# Patient Record
Sex: FEMALE | Race: OTHER | HISPANIC OR LATINO | URBAN - METROPOLITAN AREA
[De-identification: names, ages, dates, MRNs, and addresses within clinical notes are randomized per-mention and may not be internally consistent; named-entity substitution may affect disease eponyms.]

---

## 2019-01-11 ENCOUNTER — EMERGENCY (EMERGENCY)
Facility: HOSPITAL | Age: 44
LOS: 1 days | Discharge: HOME | End: 2019-01-11
Attending: EMERGENCY MEDICINE
Payer: MEDICAID

## 2019-01-11 VITALS
RESPIRATION RATE: 16 BRPM | DIASTOLIC BLOOD PRESSURE: 77 MMHG | HEART RATE: 55 BPM | SYSTOLIC BLOOD PRESSURE: 111 MMHG | OXYGEN SATURATION: 100 % | TEMPERATURE: 98 F

## 2019-01-11 VITALS
TEMPERATURE: 98 F | RESPIRATION RATE: 16 BRPM | WEIGHT: 255.96 LBS | OXYGEN SATURATION: 100 % | HEART RATE: 84 BPM | DIASTOLIC BLOOD PRESSURE: 84 MMHG | SYSTOLIC BLOOD PRESSURE: 116 MMHG | HEIGHT: 66 IN

## 2019-01-11 LAB
ALBUMIN SERPL ELPH-MCNC: 4.3 G/DL — SIGNIFICANT CHANGE UP (ref 3.3–5)
ALP SERPL-CCNC: 60 U/L — SIGNIFICANT CHANGE UP (ref 40–120)
ALT FLD-CCNC: 11 U/L — SIGNIFICANT CHANGE UP (ref 10–45)
ANION GAP SERPL CALC-SCNC: 13 MMOL/L — SIGNIFICANT CHANGE UP (ref 5–17)
APPEARANCE UR: CLEAR — SIGNIFICANT CHANGE UP
APTT BLD: 34.4 SEC — SIGNIFICANT CHANGE UP (ref 27.5–36.3)
AST SERPL-CCNC: 26 U/L — SIGNIFICANT CHANGE UP (ref 10–40)
BASOPHILS # BLD AUTO: 0 K/UL — SIGNIFICANT CHANGE UP (ref 0–0.2)
BASOPHILS NFR BLD AUTO: 0.5 % — SIGNIFICANT CHANGE UP (ref 0–2)
BILIRUB SERPL-MCNC: 0.4 MG/DL — SIGNIFICANT CHANGE UP (ref 0.2–1.2)
BILIRUB UR-MCNC: NEGATIVE — SIGNIFICANT CHANGE UP
BUN SERPL-MCNC: 12 MG/DL — SIGNIFICANT CHANGE UP (ref 7–23)
CALCIUM SERPL-MCNC: 9.3 MG/DL — SIGNIFICANT CHANGE UP (ref 8.4–10.5)
CHLORIDE SERPL-SCNC: 103 MMOL/L — SIGNIFICANT CHANGE UP (ref 96–108)
CO2 SERPL-SCNC: 24 MMOL/L — SIGNIFICANT CHANGE UP (ref 22–31)
COLOR SPEC: COLORLESS — SIGNIFICANT CHANGE UP
CREAT SERPL-MCNC: 0.61 MG/DL — SIGNIFICANT CHANGE UP (ref 0.5–1.3)
DIFF PNL FLD: NEGATIVE — SIGNIFICANT CHANGE UP
EOSINOPHIL # BLD AUTO: 0.1 K/UL — SIGNIFICANT CHANGE UP (ref 0–0.5)
EOSINOPHIL NFR BLD AUTO: 2.6 % — SIGNIFICANT CHANGE UP (ref 0–6)
GLUCOSE SERPL-MCNC: 86 MG/DL — SIGNIFICANT CHANGE UP (ref 70–99)
GLUCOSE UR QL: NEGATIVE — SIGNIFICANT CHANGE UP
HCT VFR BLD CALC: 40.9 % — SIGNIFICANT CHANGE UP (ref 34.5–45)
HGB BLD-MCNC: 13.6 G/DL — SIGNIFICANT CHANGE UP (ref 11.5–15.5)
INR BLD: 1.07 RATIO — SIGNIFICANT CHANGE UP (ref 0.88–1.16)
KETONES UR-MCNC: NEGATIVE — SIGNIFICANT CHANGE UP
LEUKOCYTE ESTERASE UR-ACNC: NEGATIVE — SIGNIFICANT CHANGE UP
LIDOCAIN IGE QN: 29 U/L — SIGNIFICANT CHANGE UP (ref 7–60)
LYMPHOCYTES # BLD AUTO: 1.4 K/UL — SIGNIFICANT CHANGE UP (ref 1–3.3)
LYMPHOCYTES # BLD AUTO: 26.2 % — SIGNIFICANT CHANGE UP (ref 13–44)
MCHC RBC-ENTMCNC: 31.4 PG — SIGNIFICANT CHANGE UP (ref 27–34)
MCHC RBC-ENTMCNC: 33.3 GM/DL — SIGNIFICANT CHANGE UP (ref 32–36)
MCV RBC AUTO: 94.3 FL — SIGNIFICANT CHANGE UP (ref 80–100)
MONOCYTES # BLD AUTO: 0.5 K/UL — SIGNIFICANT CHANGE UP (ref 0–0.9)
MONOCYTES NFR BLD AUTO: 9.7 % — SIGNIFICANT CHANGE UP (ref 2–14)
NEUTROPHILS # BLD AUTO: 3.3 K/UL — SIGNIFICANT CHANGE UP (ref 1.8–7.4)
NEUTROPHILS NFR BLD AUTO: 61 % — SIGNIFICANT CHANGE UP (ref 43–77)
NITRITE UR-MCNC: NEGATIVE — SIGNIFICANT CHANGE UP
PH UR: 6.5 — SIGNIFICANT CHANGE UP (ref 5–8)
PLATELET # BLD AUTO: 229 K/UL — SIGNIFICANT CHANGE UP (ref 150–400)
POTASSIUM SERPL-MCNC: 4.9 MMOL/L — SIGNIFICANT CHANGE UP (ref 3.5–5.3)
POTASSIUM SERPL-SCNC: 4.9 MMOL/L — SIGNIFICANT CHANGE UP (ref 3.5–5.3)
PROT SERPL-MCNC: 7.8 G/DL — SIGNIFICANT CHANGE UP (ref 6–8.3)
PROT UR-MCNC: NEGATIVE — SIGNIFICANT CHANGE UP
PROTHROM AB SERPL-ACNC: 12.2 SEC — SIGNIFICANT CHANGE UP (ref 10–12.9)
RBC # BLD: 4.34 M/UL — SIGNIFICANT CHANGE UP (ref 3.8–5.2)
RBC # FLD: 12.6 % — SIGNIFICANT CHANGE UP (ref 10.3–14.5)
SODIUM SERPL-SCNC: 140 MMOL/L — SIGNIFICANT CHANGE UP (ref 135–145)
SP GR SPEC: 1.01 — LOW (ref 1.01–1.02)
UROBILINOGEN FLD QL: NEGATIVE — SIGNIFICANT CHANGE UP
WBC # BLD: 5.4 K/UL — SIGNIFICANT CHANGE UP (ref 3.8–10.5)
WBC # FLD AUTO: 5.4 K/UL — SIGNIFICANT CHANGE UP (ref 3.8–10.5)

## 2019-01-11 PROCEDURE — 96375 TX/PRO/DX INJ NEW DRUG ADDON: CPT

## 2019-01-11 PROCEDURE — 96365 THER/PROPH/DIAG IV INF INIT: CPT | Mod: XU

## 2019-01-11 PROCEDURE — 99284 EMERGENCY DEPT VISIT MOD MDM: CPT | Mod: 25

## 2019-01-11 PROCEDURE — 87086 URINE CULTURE/COLONY COUNT: CPT

## 2019-01-11 PROCEDURE — 85610 PROTHROMBIN TIME: CPT

## 2019-01-11 PROCEDURE — 93005 ELECTROCARDIOGRAM TRACING: CPT

## 2019-01-11 PROCEDURE — 74177 CT ABD & PELVIS W/CONTRAST: CPT

## 2019-01-11 PROCEDURE — 74177 CT ABD & PELVIS W/CONTRAST: CPT | Mod: 26

## 2019-01-11 PROCEDURE — 83690 ASSAY OF LIPASE: CPT

## 2019-01-11 PROCEDURE — 80053 COMPREHEN METABOLIC PANEL: CPT

## 2019-01-11 PROCEDURE — 85730 THROMBOPLASTIN TIME PARTIAL: CPT

## 2019-01-11 PROCEDURE — 81003 URINALYSIS AUTO W/O SCOPE: CPT

## 2019-01-11 PROCEDURE — 71045 X-RAY EXAM CHEST 1 VIEW: CPT

## 2019-01-11 PROCEDURE — 93010 ELECTROCARDIOGRAM REPORT: CPT

## 2019-01-11 PROCEDURE — 85027 COMPLETE CBC AUTOMATED: CPT

## 2019-01-11 PROCEDURE — 71045 X-RAY EXAM CHEST 1 VIEW: CPT | Mod: 26

## 2019-01-11 RX ORDER — ACETAMINOPHEN 500 MG
1000 TABLET ORAL ONCE
Qty: 0 | Refills: 0 | Status: COMPLETED | OUTPATIENT
Start: 2019-01-11 | End: 2019-01-11

## 2019-01-11 RX ORDER — METOCLOPRAMIDE HCL 10 MG
10 TABLET ORAL ONCE
Qty: 0 | Refills: 0 | Status: COMPLETED | OUTPATIENT
Start: 2019-01-11 | End: 2019-01-11

## 2019-01-11 RX ORDER — SODIUM CHLORIDE 9 MG/ML
1000 INJECTION INTRAMUSCULAR; INTRAVENOUS; SUBCUTANEOUS ONCE
Qty: 0 | Refills: 0 | Status: COMPLETED | OUTPATIENT
Start: 2019-01-11 | End: 2019-01-11

## 2019-01-11 RX ADMIN — SODIUM CHLORIDE 1000 MILLILITER(S): 9 INJECTION INTRAMUSCULAR; INTRAVENOUS; SUBCUTANEOUS at 13:59

## 2019-01-11 RX ADMIN — Medication 10 MILLIGRAM(S): at 13:58

## 2019-01-11 RX ADMIN — Medication 400 MILLIGRAM(S): at 13:58

## 2019-01-11 RX ADMIN — Medication 1000 MILLIGRAM(S): at 14:34

## 2019-01-11 RX ADMIN — SODIUM CHLORIDE 1000 MILLILITER(S): 9 INJECTION INTRAMUSCULAR; INTRAVENOUS; SUBCUTANEOUS at 14:54

## 2019-01-11 RX ADMIN — Medication 1000 MILLIGRAM(S): at 14:24

## 2019-01-11 NOTE — ED ADULT NURSE NOTE - NSIMPLEMENTINTERV_GEN_ALL_ED
Implemented All Universal Safety Interventions:  [place] to call system. Call bell, personal items and telephone within reach. Instruct patient to call for assistance. Room bathroom lighting operational. Non-slip footwear when patient is off stretcher. Physically safe environment: no spills, clutter or unnecessary equipment. Stretcher in lowest position, wheels locked, appropriate side rails in place.

## 2019-01-11 NOTE — CONSULT NOTE ADULT - SUBJECTIVE AND OBJECTIVE BOX
General Surgery Consult  Consulting surgical team: Idris   Consulting attending: Dr. Hickman    HPI: Montserrat Martínez is a 43 y.o. woman with history of hypothyroidism, palpitations, appendectomy, cholecystectomy, and sleeve gastrectomy () who presents to the ED with upper abdominal pain.    The patient denies any fever, chills, nausea, vomiting, diarrhea, or constipation. The patient states that she is eating without issue as long as she keeps her portion control.       PAST MEDICAL HISTORY:  Palpitations  Hypothyroid      PAST SURGICAL HISTORY:  Cholecystectomy  Appendectomy  Sleeve Gastrectomy       ALLERGIES:  metoprolol (Rash)      VITALS & I/Os:  Vital Signs Last 24 Hrs  T(C): 36.7 (2019 15:17), Max: 36.7 (2019 12:45)  T(F): 98 (2019 15:17), Max: 98 (2019 12:45)  HR: 55 (2019 15:17) (55 - 84)  BP: 111/77 (2019 15:17) (111/77 - 116/84)  BP(mean): --  RR: 16 (2019 15:17) (16 - 16)  SpO2: 100% (2019 15:17) (100% - 100%)      PHYSICAL EXAM:  General: No acute distress  Respiratory: Nonlabored  Cardiovascular: normotensive, regular rate   Abdominal: Soft, nondistended, nontender. No rebound or guarding. No organomegaly, no palpable mass.  Extremities: Warm      LABS:                        13.6   5.4   )-----------( 229      ( 2019 14:10 )             40.9     -11    140  |  103  |  12  ----------------------------<  86  4.9   |  24  |  0.61    Ca    9.3      2019 14:10    TPro  7.8  /  Alb  4.3  /  TBili  0.4  /  DBili  x   /  AST  26  /  ALT  11  /  AlkPhos  60  01-11    Lactate:    PT/INR - ( 2019 14:10 )   PT: 12.2 sec;   INR: 1.07 ratio      PTT - ( 2019 14:10 )  PTT:34.4 sec    Urinalysis Basic - ( 2019 15:36 )    Color: Colorless / Appearance: Clear / S.009 / pH: x  Gluc: x / Ketone: Negative  / Bili: Negative / Urobili: Negative   Blood: x / Protein: Negative / Nitrite: Negative   Leuk Esterase: Negative / RBC: x / WBC x   Sq Epi: x / Non Sq Epi: x / Bacteria: x      IMAGING:  CT Abdomen and Pelvis w/ Oral Cont and w/ IV Cont (19 @ 16:02)   LOWER CHEST: Within normal limits.    LIVER: Within normal limits.  BILE DUCTS: CBD dilation measuring up to 8mm, expected changes from prior   cholecystectomy.  GALLBLADDER: Status post cholecystectomy.  SPLEEN: Within normal limits.  PANCREAS: Within normal limits.  ADRENALS: Within normal limits.  KIDNEYS/URETERS: Within normal limits.    BLADDER: Within normal limits.  REPRODUCTIVE ORGANS: Ill-defined lesions in the right adnexa measuring up   to 3 cm.    BOWEL: No bowel obstruction. Status post gastric sleeve surgery. Appendix   not visualized. No secondary signs of inflammation. A few colonic   diverticula.  PERITONEUM: No ascites.  VESSELS:  Within normal limits.  RETROPERITONEUM: No lymphadenopathy.    ABDOMINAL WALL: Within normal limits.  BONES: Degenerative changes.    IMPRESSION:     Appendix is not visualized.    Ill-defined lesions in the right adnexa suggesting follicles or   hemorrhagic cysts.

## 2019-01-11 NOTE — ED PROVIDER NOTE - NSFOLLOWUPCLINICS_GEN_ALL_ED_FT
[facility] General Internal Medicine  General Internal Medicine  [address] [place] [id number]  Phone: [phone]  Fax:   Follow Up Time: 1-3 Days    [facility] Gastroenterology  Gastroenterology  [address] [place] [address]  Phone: [phone]  Fax:   Follow Up Time: [facility] General Internal Medicine  General Internal Medicine  [address] [place] [address]  Phone: [phone]  Fax:   Follow Up Time: 1-3 Days    [facility] Gastroenterology  Gastroenterology  [address] [place] [address]  Phone: [phone]  Fax:   Follow Up Time:

## 2019-01-11 NOTE — ED PROVIDER NOTE - PROGRESS NOTE DETAILS
discussed results with patient. Patient cleared by surgery. Patient currently asymptomatic from all symptoms. Will follow up with primary doctor.

## 2019-01-11 NOTE — ED PROVIDER NOTE - CARE PLAN
Principal Discharge DX:	Pain of upper abdomen  Assessment and plan of treatment:	rest and hydration. Motrin 600mg every 6 hours for pain as needed. Follow up with primary doctor in 1-2 days. Return to the ER immediately for worsening symptoms  Secondary Diagnosis:	Cyst of ovary, unspecified laterality

## 2019-01-11 NOTE — ED PROVIDER NOTE - CHPI ED SYMPTOMS NEG
no blood in stool/no burning urination/no dysuria/no diarrhea/no nausea/no vomiting/no fever/no hematuria

## 2019-01-11 NOTE — ED PROVIDER NOTE - OBJECTIVE STATEMENT
42 yo F presenting with abdominal pain x yesterday. Patient states the pain has been constant and began 5 mins after having a bm. Patient states it is diffuse and cramping. Yesterday her pain was 10/10 now it is a 7/10. Patient states pain is worse with movement. Patient states she has also been having substernal chest pressure that happened two days ago. Patient admits to a constant everyday ha for years. Patient denies cp, sob, cough, fever, nvd, dysuria, hematuria, flank pain, vaginal discharge, back pain, tingling, numbness, weakness, and blurred vision 44 yo F with pmhx appendectomy, ovarian cysts, and gastric [place] presenting with abdominal pain x yesterday. Patient states the pain has been constant and began 5 mins after having a bm. Patient states it is diffuse and cramping. Yesterday her pain was 10/10 now it is a 7/10. Patient states pain is worse with movement. Patient states she has also been having substernal chest pressure that happened two days ago. Patient admits to a constant everyday ha for years. Patient denies cp, sob, cough, fever, nvd, dysuria, hematuria, flank pain, vaginal discharge, back pain, tingling, numbness, weakness, and blurred vision

## 2019-01-11 NOTE — ED PROVIDER NOTE - PLAN OF CARE
rest and hydration. Motrin 600mg every 6 hours for pain as needed. Follow up with primary doctor in 1-2 days. Return to the ER immediately for worsening symptoms

## 2019-01-11 NOTE — ED PROVIDER NOTE - CARE PROVIDER_API CALL
Jeffry Bush), Surgery  [address] [facility]  [address] [place] [address]  Phone: [phone]  Fax: [phone]

## 2019-01-11 NOTE — ED PROVIDER NOTE - MEDICAL DECISION MAKING DETAILS
42 yo F presenting with abdominal pain x yesterday. Patient tender to palpate umbilical area. Patient also states had chest pressure two days ago. Will obtain ekg, cxr, labs, urine and ct. Will give IV tylenol, reglan and fluids 42 yo F pmhx appendectomy, ovarian cysts, and gastric sleeve[place]  presenting with abdominal pain x yesterday. Patient tender to palpate umbilical area. Patient also states had chest pressure two days ago. Will obtain ekg, cxr, labs, urine and ct. Will give IV tylenol, reglan and fluids 42 yo F pmhx appendectomy, ovarian cysts, and gastric [place]  presenting with abdominal pain x yesterday. Patient tender to palpate umbilical area. Patient also states had chest pressure two days ago. Will obtain ekg, cxr, labs, urine and ct. Will give IV tylenol, reglan and fluids  Geno: 43 year old female with multiple surgeries, [place] presents with epigastric pain.  will get labs, ct a/p, ivf, pain control, reassess.

## 2019-01-11 NOTE — CONSULT NOTE ADULT - ASSESSMENT
Montserrat Martínez is a 43 y.o. woman with history of hypothyroidism, palpitations, appendectomy, cholecystectomy, and sleeve gastrectomy (2012) who presents to the ED with upper abdominal pain. Non-tender on exam. No acute findings associated with sleeve on CT.    Plan:  - No surgical intervention  - Patient may follow up with Dr. Bush as an outpatient if she wants to establish care with a surgeon in [place] (her surgeon is in the [place])  - Discussed with Dr. Marylou Liu, PGY2  [phone]

## 2019-01-12 LAB
CULTURE RESULTS: SIGNIFICANT CHANGE UP
SPECIMEN SOURCE: SIGNIFICANT CHANGE UP

## 2019-10-24 ENCOUNTER — OUTPATIENT (OUTPATIENT)
Dept: OUTPATIENT SERVICES | Facility: HOSPITAL | Age: 44
LOS: 1 days | End: 2019-10-24
Payer: MEDICAID

## 2019-10-24 VITALS
HEART RATE: 83 BPM | HEIGHT: 64 IN | OXYGEN SATURATION: 100 % | RESPIRATION RATE: 14 BRPM | TEMPERATURE: 98 F | SYSTOLIC BLOOD PRESSURE: 116 MMHG | WEIGHT: 266.1 LBS | DIASTOLIC BLOOD PRESSURE: 67 MMHG

## 2019-10-24 DIAGNOSIS — Z01.818 ENCOUNTER FOR OTHER PREPROCEDURAL EXAMINATION: ICD-10-CM

## 2019-10-24 DIAGNOSIS — Z30.432 ENCOUNTER FOR REMOVAL OF INTRAUTERINE CONTRACEPTIVE DEVICE: Chronic | ICD-10-CM

## 2019-10-24 DIAGNOSIS — Z90.49 ACQUIRED ABSENCE OF OTHER SPECIFIED PARTS OF DIGESTIVE TRACT: Chronic | ICD-10-CM

## 2019-10-24 DIAGNOSIS — Z98.890 OTHER SPECIFIED POSTPROCEDURAL STATES: Chronic | ICD-10-CM

## 2019-10-24 DIAGNOSIS — N92.0 EXCESSIVE AND FREQUENT MENSTRUATION WITH REGULAR CYCLE: ICD-10-CM

## 2019-10-24 DIAGNOSIS — Z98.891 HISTORY OF UTERINE SCAR FROM PREVIOUS SURGERY: Chronic | ICD-10-CM

## 2019-10-24 LAB
ANION GAP SERPL CALC-SCNC: 4 MMOL/L — LOW (ref 5–17)
BUN SERPL-MCNC: 12 MG/DL — SIGNIFICANT CHANGE UP (ref 7–23)
CALCIUM SERPL-MCNC: 8.8 MG/DL — SIGNIFICANT CHANGE UP (ref 8.5–10.1)
CHLORIDE SERPL-SCNC: 110 MMOL/L — HIGH (ref 96–108)
CO2 SERPL-SCNC: 28 MMOL/L — SIGNIFICANT CHANGE UP (ref 22–31)
CREAT SERPL-MCNC: 0.73 MG/DL — SIGNIFICANT CHANGE UP (ref 0.5–1.3)
GLUCOSE SERPL-MCNC: 97 MG/DL — SIGNIFICANT CHANGE UP (ref 70–99)
HCG SERPL-ACNC: <1 MIU/ML — SIGNIFICANT CHANGE UP
HCT VFR BLD CALC: 40.2 % — SIGNIFICANT CHANGE UP (ref 34.5–45)
HGB BLD-MCNC: 13.1 G/DL — SIGNIFICANT CHANGE UP (ref 11.5–15.5)
HIV 1+2 AB+HIV1 P24 AG SERPL QL IA: SIGNIFICANT CHANGE UP
MCHC RBC-ENTMCNC: 30.9 PG — SIGNIFICANT CHANGE UP (ref 27–34)
MCHC RBC-ENTMCNC: 32.6 GM/DL — SIGNIFICANT CHANGE UP (ref 32–36)
MCV RBC AUTO: 94.8 FL — SIGNIFICANT CHANGE UP (ref 80–100)
NRBC # BLD: 0 /100 WBCS — SIGNIFICANT CHANGE UP (ref 0–0)
PLATELET # BLD AUTO: 291 K/UL — SIGNIFICANT CHANGE UP (ref 150–400)
POTASSIUM SERPL-MCNC: 4.4 MMOL/L — SIGNIFICANT CHANGE UP (ref 3.5–5.3)
POTASSIUM SERPL-SCNC: 4.4 MMOL/L — SIGNIFICANT CHANGE UP (ref 3.5–5.3)
RBC # BLD: 4.24 M/UL — SIGNIFICANT CHANGE UP (ref 3.8–5.2)
RBC # FLD: 14.2 % — SIGNIFICANT CHANGE UP (ref 10.3–14.5)
SODIUM SERPL-SCNC: 142 MMOL/L — SIGNIFICANT CHANGE UP (ref 135–145)
WBC # BLD: 5.84 K/UL — SIGNIFICANT CHANGE UP (ref 3.8–10.5)
WBC # FLD AUTO: 5.84 K/UL — SIGNIFICANT CHANGE UP (ref 3.8–10.5)

## 2019-10-24 PROCEDURE — 86901 BLOOD TYPING SEROLOGIC RH(D): CPT

## 2019-10-24 PROCEDURE — 80048 BASIC METABOLIC PNL TOTAL CA: CPT

## 2019-10-24 PROCEDURE — 93010 ELECTROCARDIOGRAM REPORT: CPT

## 2019-10-24 PROCEDURE — 93005 ELECTROCARDIOGRAM TRACING: CPT

## 2019-10-24 PROCEDURE — G0463: CPT

## 2019-10-24 PROCEDURE — 86850 RBC ANTIBODY SCREEN: CPT

## 2019-10-24 PROCEDURE — 86900 BLOOD TYPING SEROLOGIC ABO: CPT

## 2019-10-24 PROCEDURE — 87389 HIV-1 AG W/HIV-1&-2 AB AG IA: CPT

## 2019-10-24 PROCEDURE — 36415 COLL VENOUS BLD VENIPUNCTURE: CPT

## 2019-10-24 PROCEDURE — 85027 COMPLETE CBC AUTOMATED: CPT

## 2019-10-24 PROCEDURE — 84702 CHORIONIC GONADOTROPIN TEST: CPT

## 2019-10-24 NOTE — H&P PST ADULT - NSICDXPROBLEM_GEN_ALL_CORE_FT
PROBLEM DIAGNOSES  Problem: Excessive and frequent menstruation with regular cycle  Assessment and Plan: PST labs; CBC, BMP, HcG, Type & Screen, HIV, EKG - Pt has been seen by her PCP for medical clearance on 10/16/19 - will fax PST results to PCP for review - Pt instructed to stop any NSAIDS/Herbal Supplements between now and procedure - may take Tylenol if needed for pain between now and procedure - Pt instructed she may take her Levothyroxine and her Omeprazole with small sip of water morning of procedure - pt to continue Propranolol at night as ordered - Pre-op instructions given to pt with understanding verbalized

## 2019-10-24 NOTE — H&P PST ADULT - NSICDXPASTSURGICALHX_GEN_ALL_CORE_FT
PAST SURGICAL HISTORY:  Encounter for IUD removal     H/O  section X 2    H/O ovarian cystectomy     History of appendectomy     History of cholecystectomy     History of gastric surgery Gastric Sleeve

## 2019-10-24 NOTE — H&P PST ADULT - RS GEN PE MLT RESP DETAILS PC
airway patent/breath sounds equal/respirations non-labored/clear to auscultation bilaterally/no chest wall tenderness/no wheezes

## 2019-10-24 NOTE — H&P PST ADULT - LAST ECHOCARDIOGRAM
ECHO- thinks year or so ago - does not remember where or when it was done- was sent to a cardiologist by her PCP

## 2019-10-24 NOTE — H&P PST ADULT - MUSCULOSKELETAL COMMENTS
Notes Low Back pain - H/O herniated Lumbar Discs no swelling noted to LEFT Knee following fall in parking lot prior to entering [facility] today for PST's - left knee and left palm skin abrasion only - see above in Skin exam

## 2019-10-24 NOTE — H&P PST ADULT - NSICDXPASTMEDICALHX_GEN_ALL_CORE_FT
PAST MEDICAL HISTORY:  Excessive and frequent menstruation with regular cycle     H/O heartburn     History of palpitations     Hypothyroidism     Iron deficiency anemia has received Weekly Iron infusions over past 2 years    Low back pain     Lumbar herniated disc

## 2019-10-24 NOTE — H&P PST ADULT - HISTORY OF PRESENT ILLNESS
44 year old female   (Missed AB X 1)  presents for PST prior to Dilation & Curettage Hysteroscopy W/Novasure Endometrial Ablation with Dr Forbes on 19 - pt notes - she has a "long history of iron deficiency anemia for which I have  received weekly iron infusions over last 2 years." Pt also reports history of very heavy bleeding with menses- pt has had transvaginal sonograms and she reports ovarian cyst - following discussions with Dr Forbes pt is electing for scheduled procedure.     OF NOTE: Upon arrival to PST pt stated she "tripped and fell in the parking lot while walking into the bldg." Abrasions noted to LEFT Palm and LEFT knee -sites cleaned with Peroxide and Bacitracin applied - Security came to Unit ([facility]) to take statement from patient (Incident report) 44 year old female   (Missed AB X 1)  presents for PST prior to Dilation & Curettage Hysteroscopy W/Novasure Endometrial Ablation with Dr Forbes on 19 - pt notes - she has a "long history of iron deficiency anemia for which I have  received weekly iron infusions over last 2 years." Pt also reports history of very heavy bleeding with menses- pt has had transvaginal sonograms and she reports ovarian cyst - following discussions with Dr Forbes pt is electing for scheduled procedure.     OF NOTE: Upon arrival to PST pt stated she "tripped and fell in the parking lot while walking into the bldg."  Abrasions noted to palm of LEFT hand as well as LEFT knee -sites cleaned with Peroxide and Bacitracin applied -no swelling noted to either area during PST -  Security came to Unit ([facility]) to take statement from patient (Incident report)

## 2019-10-24 NOTE — H&P PST ADULT - ATTENDING COMMENTS
pt for endometrial ablation for AUB, discussed risks of endometrial  ablation including but not limited to abnormal bleeding, infection, uterine perforation, and hematometria. explained possibility of amenorrhea following procedure. h/o BTL 8 yrs ago pt consents to planned procedure and consent is in chart

## 2019-10-24 NOTE — H&P PST ADULT - NEGATIVE ENMT SYMPTOMS
no hearing difficulty/no tinnitus/no vertigo/no sinus symptoms/no post-nasal discharge/no throat pain/no dysphagia

## 2019-10-24 NOTE — H&P PST ADULT - SKIN COMMENTS
Abrasion noted to LEFT Palm and LEFT knee secondary to fall in parking lot - both areas cleansed with peroxide - bacitracin applied to both sites

## 2019-10-24 NOTE — H&P PST ADULT - ASSESSMENT
44 year old female with Excessive and frequent menstruation with regular cycle - scheduled for Dilation & Curettage hysteroscopy W/Novasure Endometrial ablation with Dr Forbes on 11/1/19

## 2019-10-24 NOTE — H&P PST ADULT - NSICDXFAMILYHX_GEN_ALL_CORE_FT
FAMILY HISTORY:  Family history of glaucoma in mother, Mother - Alive Age 72  Family history of type 2 diabetes mellitus in father, Father - Alive Age 75  FH: HTN (hypertension), Mother - Alive Age 72

## 2019-10-24 NOTE — H&P PST ADULT - NSANTHOSAYNRD_GEN_A_CORE
No. MELLISSA screening performed.  STOP BANG Legend: 0-2 = LOW Risk; 3-4 = INTERMEDIATE Risk; 5-8 = HIGH Risk

## 2019-10-31 RX ORDER — SODIUM CHLORIDE 9 MG/ML
1000 INJECTION, SOLUTION INTRAVENOUS
Refills: 0 | Status: DISCONTINUED | OUTPATIENT
Start: 2019-11-01 | End: 2019-11-01

## 2019-10-31 RX ORDER — ONDANSETRON 8 MG/1
4 TABLET, FILM COATED ORAL ONCE
Refills: 0 | Status: DISCONTINUED | OUTPATIENT
Start: 2019-11-01 | End: 2019-11-01

## 2019-10-31 RX ORDER — HYDROMORPHONE HYDROCHLORIDE 2 MG/ML
0.5 INJECTION INTRAMUSCULAR; INTRAVENOUS; SUBCUTANEOUS ONCE
Refills: 0 | Status: DISCONTINUED | OUTPATIENT
Start: 2019-11-01 | End: 2019-11-01

## 2019-10-31 RX ORDER — ACETAMINOPHEN 500 MG
1000 TABLET ORAL ONCE
Refills: 0 | Status: DISCONTINUED | OUTPATIENT
Start: 2019-11-01 | End: 2019-11-01

## 2019-10-31 NOTE — ASU PATIENT PROFILE, ADULT - PMH
Excessive and frequent menstruation with regular cycle    H/O heartburn    History of palpitations    Hypothyroidism    Iron deficiency anemia  has received Weekly Iron infusions over past 2 years  Low back pain    Lumbar herniated disc

## 2019-11-01 ENCOUNTER — OUTPATIENT (OUTPATIENT)
Dept: OUTPATIENT SERVICES | Facility: HOSPITAL | Age: 44
LOS: 1 days | End: 2019-11-01
Payer: MEDICAID

## 2019-11-01 VITALS
RESPIRATION RATE: 16 BRPM | OXYGEN SATURATION: 96 % | HEART RATE: 64 BPM | DIASTOLIC BLOOD PRESSURE: 67 MMHG | SYSTOLIC BLOOD PRESSURE: 124 MMHG

## 2019-11-01 VITALS
DIASTOLIC BLOOD PRESSURE: 67 MMHG | WEIGHT: 266.1 LBS | HEART RATE: 59 BPM | RESPIRATION RATE: 16 BRPM | SYSTOLIC BLOOD PRESSURE: 121 MMHG | HEIGHT: 64 IN | OXYGEN SATURATION: 100 % | TEMPERATURE: 99 F

## 2019-11-01 DIAGNOSIS — Z01.818 ENCOUNTER FOR OTHER PREPROCEDURAL EXAMINATION: ICD-10-CM

## 2019-11-01 DIAGNOSIS — N92.0 EXCESSIVE AND FREQUENT MENSTRUATION WITH REGULAR CYCLE: ICD-10-CM

## 2019-11-01 DIAGNOSIS — Z98.890 OTHER SPECIFIED POSTPROCEDURAL STATES: Chronic | ICD-10-CM

## 2019-11-01 DIAGNOSIS — Z90.49 ACQUIRED ABSENCE OF OTHER SPECIFIED PARTS OF DIGESTIVE TRACT: Chronic | ICD-10-CM

## 2019-11-01 DIAGNOSIS — Z98.891 HISTORY OF UTERINE SCAR FROM PREVIOUS SURGERY: Chronic | ICD-10-CM

## 2019-11-01 DIAGNOSIS — Z30.432 ENCOUNTER FOR REMOVAL OF INTRAUTERINE CONTRACEPTIVE DEVICE: Chronic | ICD-10-CM

## 2019-11-01 LAB — HCG UR QL: NEGATIVE — SIGNIFICANT CHANGE UP

## 2019-11-01 PROCEDURE — 81025 URINE PREGNANCY TEST: CPT

## 2019-11-01 PROCEDURE — 58563 HYSTEROSCOPY ABLATION: CPT

## 2019-11-01 RX ORDER — ACETAMINOPHEN 500 MG
650 TABLET ORAL ONCE
Refills: 0 | Status: COMPLETED | OUTPATIENT
Start: 2019-11-01 | End: 2019-11-01

## 2019-11-01 RX ADMIN — SODIUM CHLORIDE 75 MILLILITER(S): 9 INJECTION, SOLUTION INTRAVENOUS at 10:19

## 2019-11-01 RX ADMIN — SODIUM CHLORIDE 75 MILLILITER(S): 9 INJECTION, SOLUTION INTRAVENOUS at 12:16

## 2019-11-01 RX ADMIN — HYDROMORPHONE HYDROCHLORIDE 0.5 MILLIGRAM(S): 2 INJECTION INTRAMUSCULAR; INTRAVENOUS; SUBCUTANEOUS at 12:14

## 2019-11-01 RX ADMIN — HYDROMORPHONE HYDROCHLORIDE 0.5 MILLIGRAM(S): 2 INJECTION INTRAMUSCULAR; INTRAVENOUS; SUBCUTANEOUS at 12:29

## 2019-11-01 RX ADMIN — Medication 650 MILLIGRAM(S): at 10:42

## 2019-11-01 NOTE — BRIEF OPERATIVE NOTE - OPERATION/FINDINGS
normal appearing endometrial cavity   novasure deployed with cavity length 6cm/width 3.8cm  cavity charred post procedure, consistent with successful ablation

## 2019-11-01 NOTE — BRIEF OPERATIVE NOTE - NSICDXBRIEFPROCEDURE_GEN_ALL_CORE_FT
PROCEDURES:  Hysteroscopy, diagnostic, with endometrial RFA, using NovaSure device 01-Nov-2019 12:08:21  Marty Forbes  Hysteroscopy 01-Nov-2019 12:08:02  Marty Forbes

## 2019-11-01 NOTE — ASU PREOP CHECKLIST - AS TEMP SITE
Patient called back.  He made an appointment outside of [facility] to have his knee looked at on Monday afternoon.  Did request a copy of his xray.  There is a request form left for Xray to do this on 4/22/2019.   oral

## 2019-11-01 NOTE — ASU DISCHARGE PLAN (ADULT/PEDIATRIC) - CARE PROVIDER_API CALL
Marty Forbes)  Gynecology Obstetrics  Gynecology  [address] [place] [address]  Phone: [phone]  Fax: [phone]  Follow Up Time:

## 2023-10-12 NOTE — H&P PST ADULT - OCCUPATION
[facility] [employer] [facility] in [place] Z Plasty Text: In order to reduce appearance and discomfort related to the web, a Z-plasty was designed.  Aftert prep and anesthesia, a horizontal incision was made across the web.  A double transposition flap was incised in a Z-plasty fashion.  The wound margins were widely undermined to elevate two flaps of skin.  After hemostasis was obtained, the flaps were transposed/carried over into place, and sutured in a a layered fashion.

## 2023-12-19 NOTE — ED PROVIDER NOTE - ATTENDING CONTRIBUTION TO CARE
Other(s)
I performed a history and physical exam of the patient and discussed their management with the Advanced Care Practitioner. I reviewed the ACP's note and agree with the documented findings and plan of care. My medical decision making and observations are found above.

## 2024-02-10 RX ORDER — PROPRANOLOL HCL 160 MG
1 CAPSULE, EXTENDED RELEASE 24HR ORAL
Qty: 0 | Refills: 0 | DISCHARGE

## 2024-02-10 RX ORDER — OMEPRAZOLE 10 MG/1
1 CAPSULE, DELAYED RELEASE ORAL
Qty: 0 | Refills: 0 | DISCHARGE

## 2024-02-10 RX ORDER — LEVOTHYROXINE SODIUM 125 MCG
1 TABLET ORAL
Qty: 0 | Refills: 0 | DISCHARGE